# Patient Record
(demographics unavailable — no encounter records)

---

## 2024-12-23 NOTE — ASSESSMENT
[FreeTextEntry1] : 66-year-old M presenting for routine screening colonoscopy in patient with personal history of CRC.   Plan: # CRC screening: Risks (including but not limited to: bowel perforation, infection, missed polyps) vs. benefits discussed. Instructions for preparation as well as procedure provided both verbally and in writing. Patient agrees to schedule procedure. SUPREP sent to pharmacy for preparation. All questions answered.

## 2024-12-23 NOTE — PHYSICAL EXAM
[Alert] : alert [Normal Voice/Communication] : normal voice/communication [Healthy Appearing] : healthy appearing [No Acute Distress] : no acute distress [Sclera] : the sclera and conjunctiva were normal [Hearing Threshold Finger Rub Not Amite] : hearing was normal [Normal Lips/Gums] : the lips and gums were normal [Normal Appearance] : the appearance of the neck was normal [No Neck Mass] : no neck mass was observed [No Respiratory Distress] : no respiratory distress [Respiration, Rhythm And Depth] : normal respiratory rhythm and effort [Heart Rate And Rhythm] : heart rate was normal and rhythm regular [Bowel Sounds] : normal bowel sounds [Abdomen Tenderness] : non-tender [No Masses] : no abdominal mass palpated [Abdomen Soft] : soft [] : no hepatosplenomegaly [Abnormal Walk] : normal gait [Oriented To Time, Place, And Person] : oriented to person, place, and time